# Patient Record
Sex: FEMALE | Race: WHITE | Employment: PART TIME
[De-identification: names, ages, dates, MRNs, and addresses within clinical notes are randomized per-mention and may not be internally consistent; named-entity substitution may affect disease eponyms.]

---

## 2023-12-20 ENCOUNTER — HOSPITAL ENCOUNTER (EMERGENCY)
Facility: HOSPITAL | Age: 23
Discharge: HOME | End: 2023-12-20
Attending: EMERGENCY MEDICINE
Payer: MEDICAID

## 2023-12-20 ENCOUNTER — APPOINTMENT (OUTPATIENT)
Facility: HOSPITAL | Age: 23
End: 2023-12-20
Payer: MEDICAID

## 2023-12-20 VITALS
WEIGHT: 113 LBS | TEMPERATURE: 98.8 F | BODY MASS INDEX: 17.13 KG/M2 | RESPIRATION RATE: 16 BRPM | HEART RATE: 75 BPM | DIASTOLIC BLOOD PRESSURE: 69 MMHG | HEIGHT: 68 IN | SYSTOLIC BLOOD PRESSURE: 117 MMHG | OXYGEN SATURATION: 99 %

## 2023-12-20 DIAGNOSIS — Z20.822 SUSPECTED COVID-19 VIRUS INFECTION: Primary | ICD-10-CM

## 2023-12-20 LAB
DEPRECATED S PYO AG THROAT QL EIA: NEGATIVE
FLUAV AG NPH QL IA: NEGATIVE
FLUBV AG NOSE QL IA: NEGATIVE
TROPONIN I SERPL HS-MCNC: <4 NG/L (ref 0–51)

## 2023-12-20 PROCEDURE — 87635 SARS-COV-2 COVID-19 AMP PRB: CPT

## 2023-12-20 PROCEDURE — 84484 ASSAY OF TROPONIN QUANT: CPT

## 2023-12-20 PROCEDURE — 36415 COLL VENOUS BLD VENIPUNCTURE: CPT

## 2023-12-20 PROCEDURE — 99285 EMERGENCY DEPT VISIT HI MDM: CPT

## 2023-12-20 PROCEDURE — 6360000002 HC RX W HCPCS: Performed by: FAMILY MEDICINE

## 2023-12-20 PROCEDURE — 87070 CULTURE OTHR SPECIMN AEROBIC: CPT

## 2023-12-20 PROCEDURE — 93005 ELECTROCARDIOGRAM TRACING: CPT | Performed by: FAMILY MEDICINE

## 2023-12-20 PROCEDURE — 87880 STREP A ASSAY W/OPTIC: CPT

## 2023-12-20 PROCEDURE — 96374 THER/PROPH/DIAG INJ IV PUSH: CPT

## 2023-12-20 PROCEDURE — 71046 X-RAY EXAM CHEST 2 VIEWS: CPT

## 2023-12-20 PROCEDURE — 2580000003 HC RX 258: Performed by: FAMILY MEDICINE

## 2023-12-20 PROCEDURE — 87804 INFLUENZA ASSAY W/OPTIC: CPT

## 2023-12-20 RX ORDER — IBUPROFEN 800 MG/1
800 TABLET ORAL EVERY 8 HOURS PRN
Qty: 21 TABLET | Refills: 0 | Status: SHIPPED | OUTPATIENT
Start: 2023-12-20 | End: 2023-12-27

## 2023-12-20 RX ORDER — KETOROLAC TROMETHAMINE 30 MG/ML
30 INJECTION, SOLUTION INTRAMUSCULAR; INTRAVENOUS
Status: DISCONTINUED | OUTPATIENT
Start: 2023-12-20 | End: 2023-12-21 | Stop reason: HOSPADM

## 2023-12-20 RX ORDER — 0.9 % SODIUM CHLORIDE 0.9 %
1000 INTRAVENOUS SOLUTION INTRAVENOUS ONCE
Status: COMPLETED | OUTPATIENT
Start: 2023-12-20 | End: 2023-12-20

## 2023-12-20 RX ORDER — ONDANSETRON 4 MG/1
4 TABLET, ORALLY DISINTEGRATING ORAL 3 TIMES DAILY PRN
Qty: 21 TABLET | Refills: 0 | Status: SHIPPED | OUTPATIENT
Start: 2023-12-20

## 2023-12-20 RX ORDER — ONDANSETRON 2 MG/ML
4 INJECTION INTRAMUSCULAR; INTRAVENOUS
Status: COMPLETED | OUTPATIENT
Start: 2023-12-20 | End: 2023-12-20

## 2023-12-20 RX ADMIN — SODIUM CHLORIDE 1000 ML: 9 INJECTION, SOLUTION INTRAVENOUS at 21:36

## 2023-12-20 RX ADMIN — ONDANSETRON 4 MG: 2 INJECTION INTRAMUSCULAR; INTRAVENOUS at 21:36

## 2023-12-21 LAB
EKG ATRIAL RATE: 68 BPM
EKG DIAGNOSIS: NORMAL
EKG P AXIS: 65 DEGREES
EKG P-R INTERVAL: 120 MS
EKG Q-T INTERVAL: 394 MS
EKG QRS DURATION: 80 MS
EKG QTC CALCULATION (BAZETT): 418 MS
EKG R AXIS: 57 DEGREES
EKG T AXIS: 42 DEGREES
EKG VENTRICULAR RATE: 68 BPM
SARS-COV-2 RNA RESP QL NAA+PROBE: NOT DETECTED
SOURCE: NORMAL

## 2023-12-21 PROCEDURE — 93010 ELECTROCARDIOGRAM REPORT: CPT | Performed by: INTERNAL MEDICINE

## 2023-12-21 NOTE — DISCHARGE INSTRUCTIONS
Thank you for allowing us to provide you with medical care today. We realize that you have many choices for your emergency care needs. We thank you for choosing [facility]. Please choose us in the future for any continued health care needs. The exam and treatment you received in the emergency department were for an emergent problem and are not intended as complete care. It is important that you follow-up with a doctor. If your symptoms worsen or you do not improve you should return to the emergency department. We are available 24 hours a day. Please make an appointment with your health care provider for follow-up of your emergency department visit. Take this sheet with you when you go to your follow-up visit.

## 2023-12-21 NOTE — ED TRIAGE NOTES
Patient arrives ambulatory to ED with complaints of nausea, sore throat, and difficulty breathing     Exposure to family member [facility]     Denies taking any medications for relief

## 2023-12-22 LAB
BACTERIA SPEC CULT: NORMAL
SERVICE CMNT-IMP: NORMAL

## 2024-04-08 ENCOUNTER — HOSPITAL ENCOUNTER (EMERGENCY)
Facility: HOSPITAL | Age: 24
Discharge: HOME | End: 2024-04-08
Attending: STUDENT IN AN ORGANIZED HEALTH CARE EDUCATION/TRAINING PROGRAM
Payer: MEDICAID

## 2024-04-08 VITALS
RESPIRATION RATE: 16 BRPM | DIASTOLIC BLOOD PRESSURE: 72 MMHG | HEIGHT: 68 IN | WEIGHT: 112 LBS | TEMPERATURE: 98.3 F | OXYGEN SATURATION: 98 % | BODY MASS INDEX: 16.97 KG/M2 | SYSTOLIC BLOOD PRESSURE: 105 MMHG | HEART RATE: 95 BPM

## 2024-04-08 DIAGNOSIS — Z32.01 POSITIVE PREGNANCY TEST: Primary | ICD-10-CM

## 2024-04-08 LAB — HCG UR QL: POSITIVE

## 2024-04-08 PROCEDURE — 81025 URINE PREGNANCY TEST: CPT

## 2024-04-08 PROCEDURE — 99282 EMERGENCY DEPT VISIT SF MDM: CPT

## 2024-04-08 ASSESSMENT — PAIN - FUNCTIONAL ASSESSMENT: PAIN_FUNCTIONAL_ASSESSMENT: NONE - DENIES PAIN

## 2024-04-08 ASSESSMENT — LIFESTYLE VARIABLES
HOW MANY STANDARD DRINKS CONTAINING ALCOHOL DO YOU HAVE ON A TYPICAL DAY: 1 OR 2
HOW OFTEN DO YOU HAVE A DRINK CONTAINING ALCOHOL: MONTHLY OR LESS

## 2024-04-09 ASSESSMENT — ENCOUNTER SYMPTOMS: SHORTNESS OF BREATH: 0

## 2024-04-09 NOTE — ED PROVIDER NOTES
Medical Decision Making  24-year-old female presents to the ED with positive home pregnancy test.  UPT positive in the emergency room as well.  No indication for any further labs or imaging.  She has no pregnancy related complaints at this time.  Will have her follow-up with OB/GYN.    Problems Addressed:  Positive pregnancy test: acute illness or injury    Amount and/or Complexity of Data Reviewed  Labs: ordered.            REASSESSMENT          CONSULTS:  None    PROCEDURES:  Unless otherwise noted below, none     Procedures    DISCHARGE NOTE:  The patient has been re-evaluated and feeling much better and are stable for discharge.  All available radiology and laboratory results have been reviewed with patient and/or available family.  Patient and/or family verbally conveyed their understanding and agreement of the patient's signs, symptoms, diagnosis, treatment and prognosis and additionally agree to follow-up as recommended in the discharge instructions or to return to the Emergency Department should their condition change or worsen prior to their follow-up appointment.  All questions have been answered and patient and/or available family express understanding.      LABORATORY RESULTS:  Labs Reviewed   POC PREGNANCY UR-QUAL - Abnormal; Notable for the following components:       Result Value    Preg Test, Ur Positive (*)     All other components within normal limits   POC PREGNANCY UR-QUAL       All other labs were within normal range or not returned as of this dictation.    IMAGING RESULTS:  No orders to display        MEDICATIONS GIVEN:  Medications - No data to display    IMPRESSION:  1. Positive pregnancy test        PLAN:  DISPOSITION Decision To Discharge 04/08/2024 08:52:30 PM      PATIENT REFERRED TO:  OBGYN    In 1 week        DISCHARGE MEDICATIONS:  Discharge Medication List as of 4/8/2024  8:53 PM          Signed By: Robby Geronimo MD     April 9, 2024        (Please note that portions of this

## 2024-04-09 NOTE — DISCHARGE INSTRUCTIONS
Your urine pregnancy test was positive.  Based on your last menstrual period of 2/28, your estimated due date would be December 4, 2024.

## 2024-04-09 NOTE — ED TRIAGE NOTES
Patient reports taking 3 pregnancy tests at home and all saying positive. Patient is requesting a blood draw test to confirm pregnancy.

## 2025-05-22 ENCOUNTER — APPOINTMENT (OUTPATIENT)
Facility: HOSPITAL | Age: 25
End: 2025-05-22

## 2025-05-22 ENCOUNTER — HOSPITAL ENCOUNTER (EMERGENCY)
Facility: HOSPITAL | Age: 25
Discharge: HOME | End: 2025-05-22
Attending: STUDENT IN AN ORGANIZED HEALTH CARE EDUCATION/TRAINING PROGRAM

## 2025-05-22 VITALS
BODY MASS INDEX: 18.04 KG/M2 | DIASTOLIC BLOOD PRESSURE: 57 MMHG | HEART RATE: 74 BPM | SYSTOLIC BLOOD PRESSURE: 103 MMHG | TEMPERATURE: 98.1 F | RESPIRATION RATE: 16 BRPM | WEIGHT: 119 LBS | OXYGEN SATURATION: 100 % | HEIGHT: 68 IN

## 2025-05-22 DIAGNOSIS — O20.0 THREATENED MISCARRIAGE IN EARLY PREGNANCY: Primary | ICD-10-CM

## 2025-05-22 DIAGNOSIS — N39.0 URINARY TRACT INFECTION WITHOUT HEMATURIA, SITE UNSPECIFIED: ICD-10-CM

## 2025-05-22 LAB
ABO + RH BLD: NORMAL
ALBUMIN SERPL-MCNC: 3.8 G/DL (ref 3.5–5)
ALBUMIN/GLOB SERPL: 1.1 (ref 1.1–2.2)
ALP SERPL-CCNC: 62 U/L (ref 45–117)
ALT SERPL-CCNC: 15 U/L (ref 12–78)
ANION GAP SERPL CALC-SCNC: 5 MMOL/L (ref 2–12)
APPEARANCE UR: CLEAR
AST SERPL-CCNC: 10 U/L (ref 15–37)
BACTERIA URNS QL MICRO: ABNORMAL /HPF
BASOPHILS # BLD: 0.03 K/UL (ref 0–0.1)
BASOPHILS NFR BLD: 0.3 % (ref 0–1)
BILIRUB SERPL-MCNC: 0.5 MG/DL (ref 0.2–1)
BILIRUB UR QL: NEGATIVE
BLOOD BANK CMNT PATIENT-IMP: NORMAL
BUN SERPL-MCNC: 13 MG/DL (ref 6–20)
BUN/CREAT SERPL: 18 (ref 12–20)
CALCIUM SERPL-MCNC: 8.8 MG/DL (ref 8.5–10.1)
CHLORIDE SERPL-SCNC: 108 MMOL/L (ref 97–108)
CO2 SERPL-SCNC: 24 MMOL/L (ref 21–32)
COLOR UR: ABNORMAL
CREAT SERPL-MCNC: 0.73 MG/DL (ref 0.55–1.02)
DIFFERENTIAL METHOD BLD: ABNORMAL
EOSINOPHIL # BLD: 0.27 K/UL (ref 0–0.4)
EOSINOPHIL NFR BLD: 2.5 % (ref 0–7)
EPITH CASTS URNS QL MICRO: ABNORMAL /LPF
ERYTHROCYTE [DISTWIDTH] IN BLOOD BY AUTOMATED COUNT: 13.7 % (ref 11.5–14.5)
GLOBULIN SER CALC-MCNC: 3.4 G/DL (ref 2–4)
GLUCOSE SERPL-MCNC: 81 MG/DL (ref 65–100)
GLUCOSE UR STRIP.AUTO-MCNC: NEGATIVE MG/DL
HCG SERPL-ACNC: ABNORMAL MIU/ML (ref 0–6)
HCT VFR BLD AUTO: 34.5 % (ref 35–47)
HGB BLD-MCNC: 11.8 G/DL (ref 11.5–16)
HGB UR QL STRIP: NEGATIVE
HYALINE CASTS URNS QL MICRO: ABNORMAL /LPF (ref 0–2)
IMM GRANULOCYTES # BLD AUTO: 0.04 K/UL (ref 0–0.04)
IMM GRANULOCYTES NFR BLD AUTO: 0.4 % (ref 0–0.5)
KETONES UR QL STRIP.AUTO: NEGATIVE MG/DL
LEUKOCYTE ESTERASE UR QL STRIP.AUTO: ABNORMAL
LYMPHOCYTES # BLD: 2.76 K/UL (ref 0.8–3.5)
LYMPHOCYTES NFR BLD: 25.2 % (ref 12–49)
MCH RBC QN AUTO: 30.3 PG (ref 26–34)
MCHC RBC AUTO-ENTMCNC: 34.2 G/DL (ref 30–36.5)
MCV RBC AUTO: 88.5 FL (ref 80–99)
MONOCYTES # BLD: 0.66 K/UL (ref 0–1)
MONOCYTES NFR BLD: 6 % (ref 5–13)
NEUTS SEG # BLD: 7.19 K/UL (ref 1.8–8)
NEUTS SEG NFR BLD: 65.6 % (ref 32–75)
NITRITE UR QL STRIP.AUTO: NEGATIVE
NRBC # BLD: 0 K/UL (ref 0–0.01)
NRBC BLD-RTO: 0 PER 100 WBC
PH UR STRIP: 6.5 (ref 5–8)
PLATELET # BLD AUTO: 310 K/UL (ref 150–400)
PMV BLD AUTO: 8.7 FL (ref 8.9–12.9)
POTASSIUM SERPL-SCNC: 3.6 MMOL/L (ref 3.5–5.1)
PROT SERPL-MCNC: 7.2 G/DL (ref 6.4–8.2)
PROT UR STRIP-MCNC: NEGATIVE MG/DL
RBC # BLD AUTO: 3.9 M/UL (ref 3.8–5.2)
RBC #/AREA URNS HPF: ABNORMAL /HPF (ref 0–5)
SODIUM SERPL-SCNC: 137 MMOL/L (ref 136–145)
SP GR UR REFRACTOMETRY: 1.02 (ref 1–1.03)
UROBILINOGEN UR QL STRIP.AUTO: 1 EU/DL (ref 0.2–1)
WBC # BLD AUTO: 11 K/UL (ref 3.6–11)
WBC URNS QL MICRO: ABNORMAL /HPF (ref 0–4)

## 2025-05-22 PROCEDURE — 85025 COMPLETE CBC W/AUTO DIFF WBC: CPT

## 2025-05-22 PROCEDURE — 76801 OB US < 14 WKS SINGLE FETUS: CPT

## 2025-05-22 PROCEDURE — 80053 COMPREHEN METABOLIC PANEL: CPT

## 2025-05-22 PROCEDURE — 36415 COLL VENOUS BLD VENIPUNCTURE: CPT

## 2025-05-22 PROCEDURE — 81001 URINALYSIS AUTO W/SCOPE: CPT

## 2025-05-22 PROCEDURE — 84702 CHORIONIC GONADOTROPIN TEST: CPT

## 2025-05-22 PROCEDURE — 87088 URINE BACTERIA CULTURE: CPT

## 2025-05-22 PROCEDURE — 87186 SC STD MICRODIL/AGAR DIL: CPT

## 2025-05-22 PROCEDURE — 86900 BLOOD TYPING SEROLOGIC ABO: CPT

## 2025-05-22 PROCEDURE — 86901 BLOOD TYPING SEROLOGIC RH(D): CPT

## 2025-05-22 PROCEDURE — 87086 URINE CULTURE/COLONY COUNT: CPT

## 2025-05-22 PROCEDURE — 6370000000 HC RX 637 (ALT 250 FOR IP): Performed by: STUDENT IN AN ORGANIZED HEALTH CARE EDUCATION/TRAINING PROGRAM

## 2025-05-22 PROCEDURE — 99284 EMERGENCY DEPT VISIT MOD MDM: CPT

## 2025-05-22 RX ORDER — CEPHALEXIN 500 MG/1
500 CAPSULE ORAL 3 TIMES DAILY
Qty: 21 CAPSULE | Refills: 0 | Status: SHIPPED | OUTPATIENT
Start: 2025-05-22 | End: 2025-05-29

## 2025-05-22 RX ORDER — PNV NO.95/FERROUS FUM/FOLIC AC 28MG-0.8MG
TABLET ORAL
Qty: 30 TABLET | Refills: 0 | Status: SHIPPED | OUTPATIENT
Start: 2025-05-22

## 2025-05-22 RX ADMIN — CEPHALEXIN 500 MG: 250 CAPSULE ORAL at 22:50

## 2025-05-22 ASSESSMENT — PAIN - FUNCTIONAL ASSESSMENT: PAIN_FUNCTIONAL_ASSESSMENT: 0-10

## 2025-05-22 ASSESSMENT — PAIN DESCRIPTION - DESCRIPTORS: DESCRIPTORS: CRAMPING

## 2025-05-22 ASSESSMENT — PAIN SCALES - GENERAL: PAINLEVEL_OUTOF10: 5

## 2025-05-22 ASSESSMENT — PAIN DESCRIPTION - LOCATION: LOCATION: ABDOMEN

## 2025-05-22 ASSESSMENT — LIFESTYLE VARIABLES: HOW OFTEN DO YOU HAVE A DRINK CONTAINING ALCOHOL: NEVER

## 2025-05-23 NOTE — ED PROVIDER NOTES
[facility] EMERGENCY DEPARTMENT  EMERGENCY DEPARTMENT ENCOUNTER      Pt Name: Neeta Woody  MRN: [id number]  Birthdate 2000  Date of evaluation: 5/22/2025  Provider: ROBERT Cunningham    CHIEF COMPLAINT       Chief Complaint   Patient presents with    Abdominal Cramping    Hematuria         HISTORY OF PRESENT ILLNESS    Patient is a 25-year-old female who presents to ED due to vaginal bleeding that started few days prior.  Reports she is currently pregnant, about 11 weeks. States he is also having slight pelvic cramping. Denies any fever, chills, nausea, vomiting, dysuria, urinary urgency, flank pain.  She has not yet been seen by an OB/GYN.            Review of External Medical Records:     Nursing Notes were reviewed.    REVIEW OF SYSTEMS       Review of Systems   All other systems reviewed and are negative.      Except as noted above the remainder of the review of systems was reviewed and negative.       PAST MEDICAL HISTORY   No past medical history on file.      SURGICAL HISTORY     No past surgical history on file.      CURRENT MEDICATIONS       Discharge Medication List as of 5/22/2025 11:03 PM        CONTINUE these medications which have NOT CHANGED    Details   ondansetron (ZOFRAN-ODT) 4 MG disintegrating tablet Take 1 tablet by mouth 3 times daily as needed for Nausea or Vomiting, Disp-21 tablet, R-0Normal      ibuprofen (ADVIL;MOTRIN) 800 MG tablet Take 1 tablet by mouth every 8 hours as needed for Pain, Disp-21 tablet, R-0Normal             ALLERGIES     Ativan [lorazepam] and Rocephin [ceftriaxone]    FAMILY HISTORY     No family history on file.       SOCIAL HISTORY       Social History     Socioeconomic History    Marital status: Single           PHYSICAL EXAM         ED TRIAGE VITALS  BP: (!) 103/57, Temp: 98.1 °F (36.7 °C), Pulse: 74, Respirations: 16, SpO2: 100 %    Body mass index is 18.09 kg/m².    Physical Exam  Vitals and nursing note reviewed.   Constitutional:      1136

## 2025-05-23 NOTE — ED TRIAGE NOTES
Patient arrived via POV.  Patient says she approximately 11 weeks pregnant.  Has had some spotting, both light pink and dark red.  Third pregnancy.  Notices only while she urinates.  Has some cramping.  No urinary symptoms.  No clots noted.    Patient is a smoker.

## 2025-05-23 NOTE — DISCHARGE INSTRUCTIONS
Take antibiotics as prescribed.  Take prenatal vitamin daily.  Please follow-up with the [facility] or her OB/GYN.  If you develop new or worsening symptoms return to the ER.

## 2025-05-24 ENCOUNTER — RESULTS FOLLOW-UP (OUTPATIENT)
Facility: HOSPITAL | Age: 25
End: 2025-05-24

## 2025-05-25 LAB
BACTERIA SPEC CULT: ABNORMAL
CC UR VC: ABNORMAL
SERVICE CMNT-IMP: ABNORMAL